# Patient Record
Sex: MALE | Race: WHITE | NOT HISPANIC OR LATINO | URBAN - METROPOLITAN AREA
[De-identification: names, ages, dates, MRNs, and addresses within clinical notes are randomized per-mention and may not be internally consistent; named-entity substitution may affect disease eponyms.]

---

## 2018-02-04 ENCOUNTER — EMERGENCY (EMERGENCY)
Facility: HOSPITAL | Age: 38
LOS: 1 days | Discharge: HOME | End: 2018-02-04
Admitting: EMERGENCY MEDICINE
Payer: COMMERCIAL

## 2018-02-04 VITALS
SYSTOLIC BLOOD PRESSURE: 127 MMHG | HEIGHT: 73 IN | WEIGHT: 179.9 LBS | OXYGEN SATURATION: 99 % | RESPIRATION RATE: 14 BRPM | HEART RATE: 48 BPM | TEMPERATURE: 98 F | DIASTOLIC BLOOD PRESSURE: 75 MMHG

## 2018-02-04 DIAGNOSIS — T18.128A FOOD IN ESOPHAGUS CAUSING OTHER INJURY, INITIAL ENCOUNTER: ICD-10-CM
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
## 2018-02-04 DIAGNOSIS — R09.89 OTHER SPECIFIED SYMPTOMS AND SIGNS INVOLVING THE CIRCULATORY AND RESPIRATORY SYSTEMS: ICD-10-CM

## 2018-02-04 PROCEDURE — 99284 EMERGENCY DEPT VISIT MOD MDM: CPT | Mod: 25

## 2018-02-04 PROCEDURE — 71046 X-RAY EXAM CHEST 2 VIEWS: CPT | Mod: 26

## 2018-02-04 RX ORDER — KETOROLAC TROMETHAMINE 30 MG/ML
30 SYRINGE (ML) INJECTION ONCE
Qty: 0 | Refills: 0 | Status: DISCONTINUED | OUTPATIENT
Start: 2018-02-04 | End: 2018-02-04

## 2018-02-04 NOTE — ED PROVIDER NOTE - OBJECTIVE STATEMENT
36 y/o M    no pmhx    Pt presenst to ER with c/o sensation of foreign body in esophagus at the level of his diaphragm. States he was eating whole salmon last night and felt himself swallow a bone after it was too late to stop himself. States he has been having sharp pain and sensation of foreign body when he swallows fluids or solids at the level diaphragm. Denies choking, difficulty breathing, wheezing, nausea/vomiting, dark stools 36 y/o M    no pmhx    Pt presents to ER with c/o sensation of foreign body in esophagus at the level of his diaphragm. States he was eating whole salmon last night and felt himself swallow a bone after it was too late to stop himself. States he has been having sharp pain and sensation of foreign body when he swallows fluids or solids at the level diaphragm. Denies choking, difficulty breathing, wheezing, nausea/vomiting, dark stools

## 2018-02-04 NOTE — ED PROVIDER NOTE - MEDICAL DECISION MAKING DETAILS
possible esophageal foreign body - small fish bone. Will get Xrays. Pt given toradol and drinking soda in attempt to dislodge

## 2018-02-04 NOTE — ED PROVIDER NOTE - PROGRESS NOTE DETAILS
Pt tolerating solids and liquids well- states toradol greatly releived sxs. Pt is "unsure if i still feel like it is there". Reassured pt that no foreign body or free air was seen on xary. Will give f/u with Dr. Lewis of GI if sxs persist. Given strict return precautions to ER tonight is sxs worsen.

## 2018-02-04 NOTE — ED PROVIDER NOTE - PHYSICAL EXAMINATION
VITAL SIGNS: I have reviewed nursing notes and confirm.  CONSTITUTIONAL: Well-developed; well-nourished; in no acute distress- resting comfortably  SKIN: Skin is warm and dry, no acute rash.  HEAD: Normocephalic; atraumatic.  EYES: PERRL, EOM intact; conjunctiva and sclera clear.  ENT: No nasal discharge; airway clear.  NECK: Supple; non tender.  CARD: S1, S2 normal; no murmurs, gallops, or rubs. Regular rate and rhythm.  RESP: No wheezes, stridor, rales or rhonchi.  ABD: Normal bowel sounds; soft; non-distended; non-tender; no hepatosplenomegaly.  EXT: Normal ROM. No clubbing, cyanosis or edema.  NEURO: Alert, oriented. Grossly unremarkable.  PSYCH: Cooperative, appropriate.

## 2018-02-04 NOTE — ED ADULT TRIAGE NOTE - CHIEF COMPLAINT QUOTE
c/o swallowing a salmon fish bone last night. no excessive drooling, no resp distress, speaking in full sentences, tolerating po. "Its worse when I laugh."

## 2020-04-04 ENCOUNTER — EMERGENCY (EMERGENCY)
Facility: HOSPITAL | Age: 40
LOS: 1 days | Discharge: HOME | End: 2020-04-04
Admitting: EMERGENCY MEDICINE
Payer: COMMERCIAL

## 2020-04-04 VITALS
TEMPERATURE: 98 F | WEIGHT: 179.9 LBS | HEART RATE: 64 BPM | OXYGEN SATURATION: 98 % | HEIGHT: 72 IN | SYSTOLIC BLOOD PRESSURE: 114 MMHG | RESPIRATION RATE: 18 BRPM | DIASTOLIC BLOOD PRESSURE: 72 MMHG

## 2020-04-04 VITALS
TEMPERATURE: 98 F | OXYGEN SATURATION: 96 % | HEART RATE: 61 BPM | RESPIRATION RATE: 16 BRPM | SYSTOLIC BLOOD PRESSURE: 113 MMHG | DIASTOLIC BLOOD PRESSURE: 69 MMHG

## 2020-04-04 LAB
ALBUMIN SERPL ELPH-MCNC: 4 G/DL — SIGNIFICANT CHANGE UP (ref 3.4–5)
ALP SERPL-CCNC: 72 U/L — SIGNIFICANT CHANGE UP (ref 40–120)
ALT FLD-CCNC: 99 U/L — HIGH (ref 12–42)
ANION GAP SERPL CALC-SCNC: 6 MMOL/L — LOW (ref 9–16)
AST SERPL-CCNC: 50 U/L — HIGH (ref 15–37)
BASOPHILS # BLD AUTO: 0.03 K/UL — SIGNIFICANT CHANGE UP (ref 0–0.2)
BASOPHILS NFR BLD AUTO: 0.6 % — SIGNIFICANT CHANGE UP (ref 0–2)
BILIRUB SERPL-MCNC: 0.5 MG/DL — SIGNIFICANT CHANGE UP (ref 0.2–1.2)
BUN SERPL-MCNC: 17 MG/DL — SIGNIFICANT CHANGE UP (ref 7–23)
CALCIUM SERPL-MCNC: 9.2 MG/DL — SIGNIFICANT CHANGE UP (ref 8.5–10.5)
CHLORIDE SERPL-SCNC: 104 MMOL/L — SIGNIFICANT CHANGE UP (ref 96–108)
CO2 SERPL-SCNC: 28 MMOL/L — SIGNIFICANT CHANGE UP (ref 22–31)
CREAT SERPL-MCNC: 1.08 MG/DL — SIGNIFICANT CHANGE UP (ref 0.5–1.3)
EOSINOPHIL # BLD AUTO: 0.14 K/UL — SIGNIFICANT CHANGE UP (ref 0–0.5)
EOSINOPHIL NFR BLD AUTO: 2.8 % — SIGNIFICANT CHANGE UP (ref 0–6)
FLU A RESULT: SIGNIFICANT CHANGE UP
FLU A RESULT: SIGNIFICANT CHANGE UP
FLUAV AG NPH QL: SIGNIFICANT CHANGE UP
FLUBV AG NPH QL: SIGNIFICANT CHANGE UP
GLUCOSE SERPL-MCNC: 96 MG/DL — SIGNIFICANT CHANGE UP (ref 70–99)
HCT VFR BLD CALC: 47.9 % — SIGNIFICANT CHANGE UP (ref 39–50)
HGB BLD-MCNC: 16.3 G/DL — SIGNIFICANT CHANGE UP (ref 13–17)
HIV 1 & 2 AB SERPL IA.RAPID: SIGNIFICANT CHANGE UP
IMM GRANULOCYTES NFR BLD AUTO: 0.2 % — SIGNIFICANT CHANGE UP (ref 0–1.5)
LYMPHOCYTES # BLD AUTO: 2.24 K/UL — SIGNIFICANT CHANGE UP (ref 1–3.3)
LYMPHOCYTES # BLD AUTO: 45.3 % — HIGH (ref 13–44)
MCHC RBC-ENTMCNC: 31.3 PG — SIGNIFICANT CHANGE UP (ref 27–34)
MCHC RBC-ENTMCNC: 34 GM/DL — SIGNIFICANT CHANGE UP (ref 32–36)
MCV RBC AUTO: 91.9 FL — SIGNIFICANT CHANGE UP (ref 80–100)
MONOCYTES # BLD AUTO: 0.33 K/UL — SIGNIFICANT CHANGE UP (ref 0–0.9)
MONOCYTES NFR BLD AUTO: 6.7 % — SIGNIFICANT CHANGE UP (ref 2–14)
NEUTROPHILS # BLD AUTO: 2.19 K/UL — SIGNIFICANT CHANGE UP (ref 1.8–7.4)
NEUTROPHILS NFR BLD AUTO: 44.4 % — SIGNIFICANT CHANGE UP (ref 43–77)
NRBC # BLD: 0 /100 WBCS — SIGNIFICANT CHANGE UP (ref 0–0)
PLATELET # BLD AUTO: 189 K/UL — SIGNIFICANT CHANGE UP (ref 150–400)
POTASSIUM SERPL-MCNC: 3.9 MMOL/L — SIGNIFICANT CHANGE UP (ref 3.5–5.3)
POTASSIUM SERPL-SCNC: 3.9 MMOL/L — SIGNIFICANT CHANGE UP (ref 3.5–5.3)
PROT SERPL-MCNC: 6.9 G/DL — SIGNIFICANT CHANGE UP (ref 6.4–8.2)
RAPID RVP RESULT: SIGNIFICANT CHANGE UP
RBC # BLD: 5.21 M/UL — SIGNIFICANT CHANGE UP (ref 4.2–5.8)
RBC # FLD: 13.1 % — SIGNIFICANT CHANGE UP (ref 10.3–14.5)
RSV RESULT: SIGNIFICANT CHANGE UP
RSV RNA RESP QL NAA+PROBE: SIGNIFICANT CHANGE UP
SODIUM SERPL-SCNC: 138 MMOL/L — SIGNIFICANT CHANGE UP (ref 132–145)
WBC # BLD: 4.94 K/UL — SIGNIFICANT CHANGE UP (ref 3.8–10.5)
WBC # FLD AUTO: 4.94 K/UL — SIGNIFICANT CHANGE UP (ref 3.8–10.5)

## 2020-04-04 PROCEDURE — 99284 EMERGENCY DEPT VISIT MOD MDM: CPT

## 2020-04-04 PROCEDURE — 93010 ELECTROCARDIOGRAM REPORT: CPT | Mod: 59

## 2020-04-04 PROCEDURE — 71045 X-RAY EXAM CHEST 1 VIEW: CPT | Mod: 26

## 2020-04-04 RX ORDER — ALBUTEROL 90 UG/1
2 AEROSOL, METERED ORAL
Qty: 1 | Refills: 0
Start: 2020-04-04

## 2020-04-04 NOTE — ED PROVIDER NOTE - OBJECTIVE STATEMENT
39 yo male with hx sports induced asthma,, felt like heart racing, felt like an asthma attack. hx pollen allergy. low grade fever resolved this past week. +mild cough, woke up with dyspnea now resolved. +diarrhea. muscle aches. throat discomfort. 39 yo male with hx sports induced asthma c/o resolving fever over the past week, felt like heart racing this morning, states it almost felt like an asthma attack. hx pollen allergy. +mild cough, woke up with dyspnea now resolved. +diarrhea. muscle aches. throat discomfort. tolerating po, no vomiting. well appearing, NAD.

## 2020-04-04 NOTE — ED ADULT TRIAGE NOTE - NSTRIAGECARE_GEN_A_ER
Face Mask Consent 2/Introductory Paragraph: Mohs surgery was explained to the patient and consent was obtained. The risks, benefits and alternatives to therapy were discussed in detail. Specifically, the risks of infection, scarring, bleeding, prolonged wound healing, incomplete removal, allergy to anesthesia, nerve injury and recurrence were addressed. Prior to the procedure, the treatment site was clearly identified and confirmed by the patient. All components of Universal Protocol/PAUSE Rule completed.

## 2020-04-04 NOTE — ED PROVIDER NOTE - CLINICAL SUMMARY MEDICAL DECISION MAKING FREE TEXT BOX
viral syndrome, well appearing, tolerating po, covid-19 testing pending, 02 sat 97% on RA, ambulated with patient in ED and did not desat, cxr clear, ekg unremarkable, rx albuterol pump. return precautions discussed.

## 2020-04-04 NOTE — ED ADULT TRIAGE NOTE - CHIEF COMPLAINT QUOTE
c/o SOB, lightheadedness, chest tightness and cough after waking up this morning. pt reports intermittent fevers for the past 10 days. as per pt, has tree pollen allergies and slept with window open. pt speaking in full sentences. denies sick contacts or recent international travel.

## 2020-04-04 NOTE — ED PROVIDER NOTE - NSFOLLOWUPINSTRUCTIONS_ED_ALL_ED_FT
You may have Coronavirus    1. STAY HOME for 14 DAYS  2. Minimize Human contact to ONLY ESSENTIAL  3. Every time you wash your hands, sing the HAPPY BIRTHDAY Song so you know you're washing long enough.  Make sure to scrub the webspace between your fingers.  4. DRINK 2-3 Liters of Pedialyte or Sugarfree Gatorade per day x at least 5 days.  The more you drink, the more energy you will have.  Your fatigue, lightheadedness, and body aches will decrease and your fever has a better chance of breaking if you are well hydrated.    5. For your Fever and Body aches take Tylenol 500mg every 6-8 hours as needed.  6. Use an inhaler for mild shortness of breath and cough  7. RETURN TO THE ER IMMEDIATELY IF YOU HAVE WORSENING SHORTNESS OF BREATH

## 2020-04-04 NOTE — ED PROVIDER NOTE - PATIENT PORTAL LINK FT
You can access the FollowMyHealth Patient Portal offered by [facility] by registering at the following website: [email or web address]. By joining [employer]’s FollowMyHealth portal, you will also be able to view your health information using other applications (apps) compatible with our system.

## 2020-04-05 LAB — SARS-COV-2 RNA SPEC QL NAA+PROBE: SIGNIFICANT CHANGE UP

## 2020-04-08 DIAGNOSIS — R05 COUGH: ICD-10-CM

## 2020-04-08 DIAGNOSIS — B34.9 VIRAL INFECTION, UNSPECIFIED: ICD-10-CM

## 2020-05-12 ENCOUNTER — EMERGENCY (EMERGENCY)
Facility: HOSPITAL | Age: 40
LOS: 1 days | Discharge: HOME | End: 2020-05-12
Attending: EMERGENCY MEDICINE | Admitting: EMERGENCY MEDICINE
Payer: COMMERCIAL

## 2020-05-12 VITALS
DIASTOLIC BLOOD PRESSURE: 89 MMHG | SYSTOLIC BLOOD PRESSURE: 136 MMHG | RESPIRATION RATE: 16 BRPM | HEIGHT: 72 IN | HEART RATE: 56 BPM | OXYGEN SATURATION: 99 % | TEMPERATURE: 98 F | WEIGHT: 179.9 LBS

## 2020-05-12 LAB
ALBUMIN SERPL ELPH-MCNC: 3.6 G/DL — SIGNIFICANT CHANGE UP (ref 3.4–5)
ALP SERPL-CCNC: 69 U/L — SIGNIFICANT CHANGE UP (ref 40–120)
ALT FLD-CCNC: 46 U/L — HIGH (ref 12–42)
ANION GAP SERPL CALC-SCNC: 7 MMOL/L — LOW (ref 9–16)
APTT BLD: 30.3 SEC — SIGNIFICANT CHANGE UP (ref 27.5–36.3)
AST SERPL-CCNC: 27 U/L — SIGNIFICANT CHANGE UP (ref 15–37)
BASOPHILS # BLD AUTO: 0.03 K/UL — SIGNIFICANT CHANGE UP (ref 0–0.2)
BASOPHILS NFR BLD AUTO: 0.5 % — SIGNIFICANT CHANGE UP (ref 0–2)
BILIRUB SERPL-MCNC: 0.3 MG/DL — SIGNIFICANT CHANGE UP (ref 0.2–1.2)
BUN SERPL-MCNC: 20 MG/DL — SIGNIFICANT CHANGE UP (ref 7–23)
CALCIUM SERPL-MCNC: 8.9 MG/DL — SIGNIFICANT CHANGE UP (ref 8.5–10.5)
CHLORIDE SERPL-SCNC: 108 MMOL/L — SIGNIFICANT CHANGE UP (ref 96–108)
CO2 SERPL-SCNC: 26 MMOL/L — SIGNIFICANT CHANGE UP (ref 22–31)
CREAT SERPL-MCNC: 1.6 MG/DL — HIGH (ref 0.5–1.3)
D DIMER BLD IA.RAPID-MCNC: <187 NG/ML DDU — SIGNIFICANT CHANGE UP
EOSINOPHIL # BLD AUTO: 0.13 K/UL — SIGNIFICANT CHANGE UP (ref 0–0.5)
EOSINOPHIL NFR BLD AUTO: 2.3 % — SIGNIFICANT CHANGE UP (ref 0–6)
GLUCOSE SERPL-MCNC: 95 MG/DL — SIGNIFICANT CHANGE UP (ref 70–99)
HCT VFR BLD CALC: 42.9 % — SIGNIFICANT CHANGE UP (ref 39–50)
HGB BLD-MCNC: 14.7 G/DL — SIGNIFICANT CHANGE UP (ref 13–17)
IMM GRANULOCYTES NFR BLD AUTO: 0.2 % — SIGNIFICANT CHANGE UP (ref 0–1.5)
INR BLD: 1.06 — SIGNIFICANT CHANGE UP (ref 0.88–1.16)
LYMPHOCYTES # BLD AUTO: 2.07 K/UL — SIGNIFICANT CHANGE UP (ref 1–3.3)
LYMPHOCYTES # BLD AUTO: 36.3 % — SIGNIFICANT CHANGE UP (ref 13–44)
MCHC RBC-ENTMCNC: 31.6 PG — SIGNIFICANT CHANGE UP (ref 27–34)
MCHC RBC-ENTMCNC: 34.3 GM/DL — SIGNIFICANT CHANGE UP (ref 32–36)
MCV RBC AUTO: 92.3 FL — SIGNIFICANT CHANGE UP (ref 80–100)
MONOCYTES # BLD AUTO: 0.49 K/UL — SIGNIFICANT CHANGE UP (ref 0–0.9)
MONOCYTES NFR BLD AUTO: 8.6 % — SIGNIFICANT CHANGE UP (ref 2–14)
NEUTROPHILS # BLD AUTO: 2.98 K/UL — SIGNIFICANT CHANGE UP (ref 1.8–7.4)
NEUTROPHILS NFR BLD AUTO: 52.1 % — SIGNIFICANT CHANGE UP (ref 43–77)
NRBC # BLD: 0 /100 WBCS — SIGNIFICANT CHANGE UP (ref 0–0)
PLATELET # BLD AUTO: 180 K/UL — SIGNIFICANT CHANGE UP (ref 150–400)
POTASSIUM SERPL-MCNC: 4.3 MMOL/L — SIGNIFICANT CHANGE UP (ref 3.5–5.3)
POTASSIUM SERPL-SCNC: 4.3 MMOL/L — SIGNIFICANT CHANGE UP (ref 3.5–5.3)
PROT SERPL-MCNC: 6.4 G/DL — SIGNIFICANT CHANGE UP (ref 6.4–8.2)
PROTHROM AB SERPL-ACNC: 11.8 SEC — SIGNIFICANT CHANGE UP (ref 10–12.9)
RBC # BLD: 4.65 M/UL — SIGNIFICANT CHANGE UP (ref 4.2–5.8)
RBC # FLD: 13.6 % — SIGNIFICANT CHANGE UP (ref 10.3–14.5)
SODIUM SERPL-SCNC: 141 MMOL/L — SIGNIFICANT CHANGE UP (ref 132–145)
TROPONIN I SERPL-MCNC: <0.017 NG/ML — LOW (ref 0.02–0.06)
WBC # BLD: 5.71 K/UL — SIGNIFICANT CHANGE UP (ref 3.8–10.5)
WBC # FLD AUTO: 5.71 K/UL — SIGNIFICANT CHANGE UP (ref 3.8–10.5)

## 2020-05-12 PROCEDURE — 99284 EMERGENCY DEPT VISIT MOD MDM: CPT

## 2020-05-12 PROCEDURE — 93010 ELECTROCARDIOGRAM REPORT: CPT | Mod: 59

## 2020-05-12 RX ORDER — OFLOXACIN 0.3 %
1 DROPS OPHTHALMIC (EYE) ONCE
Refills: 0 | Status: COMPLETED | OUTPATIENT
Start: 2020-05-12 | End: 2020-05-12

## 2020-05-12 RX ADMIN — Medication 1 DROP(S): at 23:28

## 2020-05-12 NOTE — ED PROVIDER NOTE - CLINICAL SUMMARY MEDICAL DECISION MAKING FREE TEXT BOX
screening ekg nonischemic, d-dimer for low risk PE eval, ofloxaxin for corneal abrasion, reassess screening ekg nonischemic, d-dimer for low risk PE eval, ofloxaxin for corneal abrasion, duration and presentation not c/w acs, cardiac markers added to eval for mateus-myocarditis though ekg has no overt findings to suggest such,

## 2020-05-12 NOTE — ED PROVIDER NOTE - OBJECTIVE STATEMENT
40 yom pw med eval.  pt states sx initially began 3/23 w/ fever, 99-100F, about 1 wk, then fever went to 102 for 3 days, a/w upper abdomen "electrical sensation" and similar sensation in throat, but only at night.  1 day of sob which resolved on its own.  subsequently, 9 days later, pt re-developed fever to 102 x 2 days.  also states loose stool since 3/23, no watery diarrhea.  no vomiting.  nonbloody diarrhea.  no preceding abx or hospitalization.  at the same time, pink eye, went to , had tobradex w/o relief, transitioned to ofloxacin for 7 days, after which noted fb sensation when using contacts.  also reports pressure like HA x 2 wk w/ difficulty to concentrate, no blurry vision, no neck pain, no focal weakness, no change in speech.  able to complete ADL.  most recently, pt reports 4 days of chest pain, constant, relives by aspirin, tightness, nonradiating, no pleurisy or orthopnea.  pt states sx resolves w/ aspirin and came in as he doesn't want to keep taking aspirin, and was concerned for vasculitis associated w/ covid.  denies hx of dvt/pe, no calf pain/swelling, no exogenous hormone.   denies any PMH.  FHx of mom of unclear cardiac event at age of 40 but quadruple bypass at age of 60.  no cigarette/cocaine/heroin.  sexually active, last active in March 8, 2020, prior to that, Nov 2019, both insertive and receptive, either uses truvada or condom.  recent neg HIV test.  pt had neg covid swab and neg covid antibody test recently.

## 2020-05-12 NOTE — ED ADULT NURSE NOTE - CHPI ED NUR SYMPTOMS NEG
no back pain/no syncope/no fever/no nausea/no chills/no diaphoresis/no dizziness/no congestion/no vomiting/no shortness of breath

## 2020-05-12 NOTE — ED PROVIDER NOTE - PATIENT PORTAL LINK FT
You can access the FollowMyHealth Patient Portal offered by [facility] by registering at the following website: [email or web address]. By joining [employer]’s FollowMyHealth portal, you will also be able to view your health information using other applications (apps) compatible with our system.

## 2020-05-12 NOTE — ED PROVIDER NOTE - CARE PLAN
Principal Discharge DX:	Chest tightness Principal Discharge DX:	Chest tightness  Secondary Diagnosis:	MAURICIO (acute kidney injury)

## 2020-05-12 NOTE — ED PROVIDER NOTE - NSFOLLOWUPINSTRUCTIONS_ED_ALL_ED_FT
Follow up with your primary care doctor/listed below  Stop using aspirin/motrin/ibuprofen/naproxen or any NSAIDs  Use tylenol instead for pain as needed  Return immediately for any new or worsening symptoms or any new concerns Follow up with your primary care doctor/listed below  Stop using aspirin/motrin/ibuprofen/naproxen or any NSAIDs  Use tylenol instead for pain as needed  Put 2 drops into affected eye every 15 minutes for the first 6 hours, then 2 drops into the affected eye every 30 minutes for the remainder of the first day. On day 2, instill 2 drops into the affected eye hourly. On days 3-14, instill 2 drops into affected eye every 4 hours   Discuss with your PCP about follow up kidney function testing  Return immediately for any new or worsening symptoms or any new concerns

## 2020-05-12 NOTE — ED PROVIDER NOTE - PROGRESS NOTE DETAILS
pt reports 325mg asa x 2 BID x 4 days, last dose was 5pm.  likely ASA induced MAURICIO.  no agap, and normal bicarb, low suspicion for acidosis, dose < 150mg/kg asa over the last 4 days in total, instructed pt to stop taking asa.

## 2020-05-12 NOTE — ED PROVIDER NOTE - PHYSICAL EXAMINATION
Physical Exam  GEN: Awake, alert, non-toxic appearing, NCAT  EYES: full EOMI, no chemosis, fluorescein stain w/ small scratches R>L,   ENT: External inspection normal, normal voice,   HEAD: atraumatic  NECK: FROM neck, supple, no meningismus, trachea midline, no JVD  CV: rrr, no edema  RESP: cta bl, no tachypnea, no hypoxia, no resp distress,  GI: +BS, Soft, nontender, no guarding/rebound,   MSK: FROM all 4 extremities,

## 2020-05-12 NOTE — ED ADULT NURSE NOTE - OBJECTIVE STATEMENT
41 yo M c/o multiple medical complaints. Reports that since March, pt has experienced intermittent: fever, diarrhea, "pink eye", chest pain, and headaches. Pt states "when I try to put in my contacts it feels like there is glass in my eyes." Highest fever 102, pt reports being afebrile for last two weeks. CP is intermittent and worsens upon breathing. Tested COVID neg. Denies medical hx, trauma, syncope. Pt speaking in full complete sentences, ambulatory with steady gait.

## 2020-05-12 NOTE — ED ADULT TRIAGE NOTE - CHIEF COMPLAINT QUOTE
chest pain pt rambling difficult to redirect back to presentation to the er. states tested negative for covid 19 and antibodies but has had recurrent chest pain for 2 weeks unable to states exactly where in chest, denies radiation, also has had recurrent "pink eye" and "no one seems to care", denies current chest pain

## 2020-05-13 LAB
APAP SERPL-MCNC: <2 UG/ML — LOW (ref 10–30)
PCO2 BLDV: 46 MMHG — SIGNIFICANT CHANGE UP (ref 41–51)
PH BLDV: 7.38 — SIGNIFICANT CHANGE UP (ref 7.32–7.43)
PO2 BLDV: 37 MMHG — SIGNIFICANT CHANGE UP (ref 35–40)
SALICYLATES SERPL-MCNC: 10.6 MG/DL — SIGNIFICANT CHANGE UP (ref 2.8–20)
SAO2 % BLDV: 71 % — SIGNIFICANT CHANGE UP
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
## 2020-05-16 DIAGNOSIS — S05.02XA INJURY OF CONJUNCTIVA AND CORNEAL ABRASION WITHOUT FOREIGN BODY, LEFT EYE, INITIAL ENCOUNTER: ICD-10-CM

## 2020-05-16 DIAGNOSIS — N17.9 ACUTE KIDNEY FAILURE, UNSPECIFIED: ICD-10-CM
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
## 2020-05-16 DIAGNOSIS — R07.89 OTHER CHEST PAIN: ICD-10-CM
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
## 2020-05-16 DIAGNOSIS — S05.01XA INJURY OF CONJUNCTIVA AND CORNEAL ABRASION WITHOUT FOREIGN BODY, RIGHT EYE, INITIAL ENCOUNTER: ICD-10-CM

## 2020-05-16 DIAGNOSIS — R50.9 FEVER, UNSPECIFIED: ICD-10-CM
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
## 2020-10-28 ENCOUNTER — EMERGENCY (EMERGENCY)
Facility: HOSPITAL | Age: 40
LOS: 1 days | Discharge: HOME | End: 2020-10-28
Attending: EMERGENCY MEDICINE | Admitting: EMERGENCY MEDICINE
Payer: COMMERCIAL

## 2020-10-28 VITALS
RESPIRATION RATE: 16 BRPM | TEMPERATURE: 98 F | DIASTOLIC BLOOD PRESSURE: 77 MMHG | SYSTOLIC BLOOD PRESSURE: 120 MMHG | OXYGEN SATURATION: 96 % | WEIGHT: 184.97 LBS | HEART RATE: 54 BPM | HEIGHT: 72 IN

## 2020-10-28 DIAGNOSIS — R09.81 NASAL CONGESTION: ICD-10-CM

## 2020-10-28 DIAGNOSIS — U07.1 COVID-19: ICD-10-CM

## 2020-10-28 PROBLEM — Z78.9 OTHER SPECIFIED HEALTH STATUS: Chronic | Status: ACTIVE | Noted: 2020-05-12

## 2020-10-28 PROCEDURE — 99282 EMERGENCY DEPT VISIT SF MDM: CPT

## 2020-10-28 NOTE — ED PROVIDER NOTE - OBJECTIVE STATEMENT
41 y/o M with no pertinent PMHx presents to the ED requesting to have screening testing done for COVID-19. Pt reports he was tested positive for COVID-19 30 minutes PTA. He states he wanted to have a second test at the ED. Pt endorses nasal congestion. He denies fevers, chills, coughs, CP, SOB, recent travels and any known contacts with any individuals tested positive for COVID-19.

## 2020-10-28 NOTE — ED PROVIDER NOTE - CLINICAL SUMMARY MEDICAL DECISION MAKING FREE TEXT BOX
41 y/o M presents to the ED requesting to have screening testing done for COVID-19. On exam, Pt appears well and in no apparent distress. No vital sign derangements. No conversational dyspnea. Will swab for COVID-19 and D/C afterwards. 41 y/o M presents to the ED requesting to have screening testing done for COVID-19. On exam, Pt appears well and in no apparent distress. No vital sign derangements. No conversational dyspnea. Discussed the risk benefits of second covid test with patient in terms of test sensitivity and specificity. Post education pt declines second test and was receptive to quarantine education and requirements.+-1

## 2020-10-28 NOTE — ED PROVIDER NOTE - PATIENT PORTAL LINK FT
You can access the FollowMyHealth Patient Portal offered by [facility] by registering at the following website: [email or web address]. By joining [employer]’s FollowMyHealth portal, you will also be able to view your health information using other applications (apps) compatible with our system.

## 2020-11-19 ENCOUNTER — EMERGENCY (EMERGENCY)
Facility: HOSPITAL | Age: 40
LOS: 1 days | Discharge: HOME | End: 2020-11-19
Attending: EMERGENCY MEDICINE | Admitting: EMERGENCY MEDICINE
Payer: COMMERCIAL

## 2020-11-19 VITALS
DIASTOLIC BLOOD PRESSURE: 70 MMHG | RESPIRATION RATE: 18 BRPM | SYSTOLIC BLOOD PRESSURE: 115 MMHG | HEART RATE: 64 BPM | TEMPERATURE: 98 F | OXYGEN SATURATION: 98 % | HEIGHT: 72 IN

## 2020-11-19 DIAGNOSIS — J02.9 ACUTE PHARYNGITIS, UNSPECIFIED: ICD-10-CM

## 2020-11-19 DIAGNOSIS — U07.1 COVID-19: ICD-10-CM

## 2020-11-19 LAB — S PYO AG SPEC QL IA: NEGATIVE — SIGNIFICANT CHANGE UP

## 2020-11-19 PROCEDURE — 99284 EMERGENCY DEPT VISIT MOD MDM: CPT

## 2020-11-19 RX ORDER — AZITHROMYCIN 500 MG/1
1000 TABLET, FILM COATED ORAL ONCE
Refills: 0 | Status: COMPLETED | OUTPATIENT
Start: 2020-11-19 | End: 2020-11-19

## 2020-11-19 RX ORDER — CEFTRIAXONE 500 MG/1
250 INJECTION, POWDER, FOR SOLUTION INTRAMUSCULAR; INTRAVENOUS ONCE
Refills: 0 | Status: COMPLETED | OUTPATIENT
Start: 2020-11-19 | End: 2020-11-19

## 2020-11-19 RX ADMIN — AZITHROMYCIN 1000 MILLIGRAM(S): 500 TABLET, FILM COATED ORAL at 22:26

## 2020-11-19 RX ADMIN — CEFTRIAXONE 250 MILLIGRAM(S): 500 INJECTION, POWDER, FOR SOLUTION INTRAMUSCULAR; INTRAVENOUS at 22:26

## 2020-11-19 NOTE — ED PROVIDER NOTE - PATIENT PORTAL LINK FT
Yes You can access the FollowMyHealth Patient Portal offered by [facility] by registering at the following website: [email or web address]. By joining [employer]’s FollowMyHealth portal, you will also be able to view your health information using other applications (apps) compatible with our system.

## 2020-11-19 NOTE — ED PROVIDER NOTE - OBJECTIVE STATEMENT
39 yo female pt, no hx of med problems, nkda. Presents for repeat covid testing. Pt tested + 4 weeks ago. Currently considering visiting family for the holidays sp wanted to make sure he had a neg covid swab. Has had a persistent "scratchy" throat so was wondering if he could also have a throat swab. no fever, no chills, no chest pain, no sob, no abd pain, no nausea, no vomiting.

## 2020-11-19 NOTE — ED PROVIDER NOTE - CLINICAL SUMMARY MEDICAL DECISION MAKING FREE TEXT BOX
Swabbed for covid. also throat rapid strep and culture for persistent throat discomfort. normal throat on exam, no erythema, no exudates.

## 2020-11-19 NOTE — ED PROVIDER NOTE - PROGRESS NOTE DETAILS
don strep, pt had concern for std, covered w azithro/ceftriaxone empirically, encouraged f/u w primary care for testing.

## 2020-11-20 LAB — SARS-COV-2 RNA SPEC QL NAA+PROBE: DETECTED

## 2020-11-22 LAB
CULTURE RESULTS: SIGNIFICANT CHANGE UP
SPECIMEN SOURCE: SIGNIFICANT CHANGE UP

## 2020-11-24 ENCOUNTER — EMERGENCY (EMERGENCY)
Facility: HOSPITAL | Age: 40
LOS: 1 days | Discharge: HOME | End: 2020-11-24
Admitting: EMERGENCY MEDICINE
Payer: COMMERCIAL

## 2020-11-24 VITALS
OXYGEN SATURATION: 98 % | DIASTOLIC BLOOD PRESSURE: 69 MMHG | SYSTOLIC BLOOD PRESSURE: 112 MMHG | HEART RATE: 56 BPM | RESPIRATION RATE: 16 BRPM | TEMPERATURE: 98 F

## 2020-11-24 VITALS
WEIGHT: 179.9 LBS | SYSTOLIC BLOOD PRESSURE: 100 MMHG | OXYGEN SATURATION: 98 % | TEMPERATURE: 98 F | RESPIRATION RATE: 16 BRPM | HEIGHT: 72 IN | HEART RATE: 103 BPM | DIASTOLIC BLOOD PRESSURE: 63 MMHG

## 2020-11-24 DIAGNOSIS — Z79.899 OTHER LONG TERM (CURRENT) DRUG THERAPY: ICD-10-CM

## 2020-11-24 DIAGNOSIS — R07.89 OTHER CHEST PAIN: ICD-10-CM

## 2020-11-24 DIAGNOSIS — U07.1 COVID-19: ICD-10-CM

## 2020-11-24 DIAGNOSIS — R00.0 TACHYCARDIA, UNSPECIFIED: ICD-10-CM

## 2020-11-24 LAB
ALBUMIN SERPL ELPH-MCNC: 4.1 G/DL — SIGNIFICANT CHANGE UP (ref 3.4–5)
ALP SERPL-CCNC: 63 U/L — SIGNIFICANT CHANGE UP (ref 40–120)
ALT FLD-CCNC: 45 U/L — HIGH (ref 12–42)
AMPHET UR-MCNC: NEGATIVE — SIGNIFICANT CHANGE UP
ANION GAP SERPL CALC-SCNC: 6 MMOL/L — LOW (ref 9–16)
APTT BLD: 33.8 SEC — SIGNIFICANT CHANGE UP (ref 27.5–35.5)
AST SERPL-CCNC: 41 U/L — HIGH (ref 15–37)
BARBITURATES UR SCN-MCNC: NEGATIVE — SIGNIFICANT CHANGE UP
BASOPHILS # BLD AUTO: 0.03 K/UL — SIGNIFICANT CHANGE UP (ref 0–0.2)
BASOPHILS NFR BLD AUTO: 0.7 % — SIGNIFICANT CHANGE UP (ref 0–2)
BENZODIAZ UR-MCNC: NEGATIVE — SIGNIFICANT CHANGE UP
BILIRUB SERPL-MCNC: 0.4 MG/DL — SIGNIFICANT CHANGE UP (ref 0.2–1.2)
BUN SERPL-MCNC: 20 MG/DL — SIGNIFICANT CHANGE UP (ref 7–23)
CALCIUM SERPL-MCNC: 9.2 MG/DL — SIGNIFICANT CHANGE UP (ref 8.5–10.5)
CHLORIDE SERPL-SCNC: 107 MMOL/L — SIGNIFICANT CHANGE UP (ref 96–108)
CO2 SERPL-SCNC: 28 MMOL/L — SIGNIFICANT CHANGE UP (ref 22–31)
COCAINE METAB.OTHER UR-MCNC: NEGATIVE — SIGNIFICANT CHANGE UP
CREAT SERPL-MCNC: 1.06 MG/DL — SIGNIFICANT CHANGE UP (ref 0.5–1.3)
EOSINOPHIL # BLD AUTO: 0.07 K/UL — SIGNIFICANT CHANGE UP (ref 0–0.5)
EOSINOPHIL NFR BLD AUTO: 1.6 % — SIGNIFICANT CHANGE UP (ref 0–6)
ETHANOL SERPL-MCNC: <3 MG/DL — SIGNIFICANT CHANGE UP
GLUCOSE SERPL-MCNC: 93 MG/DL — SIGNIFICANT CHANGE UP (ref 70–99)
HCT VFR BLD CALC: 44.3 % — SIGNIFICANT CHANGE UP (ref 39–50)
HGB BLD-MCNC: 15.1 G/DL — SIGNIFICANT CHANGE UP (ref 13–17)
IMM GRANULOCYTES NFR BLD AUTO: 0.2 % — SIGNIFICANT CHANGE UP (ref 0–1.5)
INR BLD: 1.05 — SIGNIFICANT CHANGE UP (ref 0.88–1.16)
LACTATE SERPL-SCNC: 0.8 MMOL/L — SIGNIFICANT CHANGE UP (ref 0.4–2)
LIDOCAIN IGE QN: 201 U/L — SIGNIFICANT CHANGE UP (ref 73–393)
LYMPHOCYTES # BLD AUTO: 1.93 K/UL — SIGNIFICANT CHANGE UP (ref 1–3.3)
LYMPHOCYTES # BLD AUTO: 44.1 % — HIGH (ref 13–44)
MAGNESIUM SERPL-MCNC: 2.2 MG/DL — SIGNIFICANT CHANGE UP (ref 1.6–2.6)
MCHC RBC-ENTMCNC: 31.7 PG — SIGNIFICANT CHANGE UP (ref 27–34)
MCHC RBC-ENTMCNC: 34.1 GM/DL — SIGNIFICANT CHANGE UP (ref 32–36)
MCV RBC AUTO: 93.1 FL — SIGNIFICANT CHANGE UP (ref 80–100)
METHADONE UR-MCNC: NEGATIVE — SIGNIFICANT CHANGE UP
MONOCYTES # BLD AUTO: 0.34 K/UL — SIGNIFICANT CHANGE UP (ref 0–0.9)
MONOCYTES NFR BLD AUTO: 7.8 % — SIGNIFICANT CHANGE UP (ref 2–14)
NEUTROPHILS # BLD AUTO: 2 K/UL — SIGNIFICANT CHANGE UP (ref 1.8–7.4)
NEUTROPHILS NFR BLD AUTO: 45.6 % — SIGNIFICANT CHANGE UP (ref 43–77)
NRBC # BLD: 0 /100 WBCS — SIGNIFICANT CHANGE UP (ref 0–0)
NT-PROBNP SERPL-SCNC: 44 PG/ML — SIGNIFICANT CHANGE UP
OPIATES UR-MCNC: NEGATIVE — SIGNIFICANT CHANGE UP
PCO2 BLDV: 52 MMHG — HIGH (ref 41–51)
PCP SPEC-MCNC: SIGNIFICANT CHANGE UP
PCP UR-MCNC: NEGATIVE — SIGNIFICANT CHANGE UP
PH BLDV: 7.37 — SIGNIFICANT CHANGE UP (ref 7.32–7.43)
PLATELET # BLD AUTO: 179 K/UL — SIGNIFICANT CHANGE UP (ref 150–400)
PO2 BLDV: 25 MMHG — LOW (ref 35–40)
POTASSIUM SERPL-MCNC: 4.8 MMOL/L — SIGNIFICANT CHANGE UP (ref 3.5–5.3)
POTASSIUM SERPL-SCNC: 4.8 MMOL/L — SIGNIFICANT CHANGE UP (ref 3.5–5.3)
PROT SERPL-MCNC: 7 G/DL — SIGNIFICANT CHANGE UP (ref 6.4–8.2)
PROTHROM AB SERPL-ACNC: 12.6 SEC — SIGNIFICANT CHANGE UP (ref 10.6–13.6)
RBC # BLD: 4.76 M/UL — SIGNIFICANT CHANGE UP (ref 4.2–5.8)
RBC # FLD: 13 % — SIGNIFICANT CHANGE UP (ref 10.3–14.5)
SAO2 % BLDV: 44 % — SIGNIFICANT CHANGE UP
SARS-COV-2 RNA SPEC QL NAA+PROBE: DETECTED
SODIUM SERPL-SCNC: 141 MMOL/L — SIGNIFICANT CHANGE UP (ref 132–145)
THC UR QL: NEGATIVE — SIGNIFICANT CHANGE UP
TROPONIN I SERPL-MCNC: <0.017 NG/ML — LOW (ref 0.02–0.06)
WBC # BLD: 4.38 K/UL — SIGNIFICANT CHANGE UP (ref 3.8–10.5)
WBC # FLD AUTO: 4.38 K/UL — SIGNIFICANT CHANGE UP (ref 3.8–10.5)

## 2020-11-24 PROCEDURE — 71045 X-RAY EXAM CHEST 1 VIEW: CPT | Mod: 26

## 2020-11-24 PROCEDURE — 71275 CT ANGIOGRAPHY CHEST: CPT | Mod: 26

## 2020-11-24 PROCEDURE — 93010 ELECTROCARDIOGRAM REPORT: CPT | Mod: 59

## 2020-11-24 PROCEDURE — 99285 EMERGENCY DEPT VISIT HI MDM: CPT | Mod: 25

## 2020-11-24 NOTE — ED PROVIDER NOTE - PATIENT PORTAL LINK FT
You can access the FollowMyHealth Patient Portal offered by [facility] by registering at the following website: [email or web address]. By joining [facility]’s FollowMyHealth portal, you will also be able to view your health information using other applications (apps) compatible with our system.

## 2020-11-24 NOTE — ED PROVIDER NOTE - CHPI ED SYMPTOMS NEG
no shortness of breath/no fever/no wheezing/no chills/no dizziness, no N/V/D/V, no abdominal pain, no malaise/no cough/no edema

## 2020-11-24 NOTE — ED ADULT NURSE NOTE - OBJECTIVE STATEMENT
Pt. presents with mid seternal cj Pt. presents with mid sternal chest pressure x 2 days. Pt. states he tested +COVID 4 weeks ago put for 2 weeks has been feeling symptom free. Pt. reports he repeated PCR test on 11/19/20 and results were positive. Pt. denies chest pain/palpitations, SOB, HA, dizziness, v/d or abdominal pain. States he woke up with mild nausea but now has resolved. Pt. has non labored breathing, speaking in full sentences with no use of accessory muscle, nasal flaring or cough.

## 2020-11-24 NOTE — ED PROVIDER NOTE - OBJECTIVE STATEMENT
39 y/o male with  recent diagnosis of covid x4 weeks ago presents to ED co CP x2 days. On arrival, pt is tachycardic. Pt states he has no other medical complaints. Denies fever, chills, wheezing, SOB, palpitations, peripheral edema, focal weakness, sore throat, tinnitus, headache, dizziness, N/V/D/C, abdominal pain, change in urinary/bowel function, and malaise.

## 2020-11-24 NOTE — ED ADULT NURSE NOTE - CHPI ED NUR SYMPTOMS NEG
no diaphoresis/no fever/no dizziness/no syncope/no shortness of breath/no vomiting/no back pain/no chills

## 2020-11-24 NOTE — ED PROVIDER NOTE - CLINICAL SUMMARY MEDICAL DECISION MAKING FREE TEXT BOX
39 y/o male presents to ED c/o of chest pain. Of note, pt received covid diagnosis x4 weeks ago. On arrival, pt was tachycardic. Otherwise, vitals are within normal range. Will order labs, EKG, and chest X-ray to r/o PE. Follow-up with pulmonologist.

## 2020-11-24 NOTE — ED ADULT TRIAGE NOTE - CHIEF COMPLAINT QUOTE
Walk in with c/o left sided CP x2days "pressure"  with intermittent radiation to left arm, and HILL, + covid x4 weeks PTA.

## 2020-12-05 ENCOUNTER — EMERGENCY (EMERGENCY)
Facility: HOSPITAL | Age: 40
LOS: 1 days | Discharge: HOME | End: 2020-12-05
Attending: EMERGENCY MEDICINE | Admitting: EMERGENCY MEDICINE
Payer: COMMERCIAL

## 2020-12-05 VITALS
SYSTOLIC BLOOD PRESSURE: 108 MMHG | HEART RATE: 55 BPM | TEMPERATURE: 98 F | RESPIRATION RATE: 16 BRPM | DIASTOLIC BLOOD PRESSURE: 68 MMHG | OXYGEN SATURATION: 97 % | HEIGHT: 72 IN

## 2020-12-05 DIAGNOSIS — U07.1 COVID-19: ICD-10-CM

## 2020-12-05 LAB
SARS-COV-2 IGG SERPL QL IA: POSITIVE
SARS-COV-2 IGM SERPL IA-ACNC: 7.61 INDEX — HIGH

## 2020-12-05 PROCEDURE — 99283 EMERGENCY DEPT VISIT LOW MDM: CPT

## 2020-12-05 NOTE — ED PROVIDER NOTE - OBJECTIVE STATEMENT
40 year old male presenting to the ED requesting COVID-19 screening. Patient is currently asymptomatic and has no other medical complaints at this time. Denies fever, chills, chest pain, SOB.

## 2020-12-05 NOTE — ED PROVIDER NOTE - PATIENT PORTAL LINK FT
You can access the FollowMyHealth Patient Portal offered by [facility] by registering at the following website: [email or web address]. By joining [employer]’s FollowMyHealth portal, you will also be able to view your health information using other applications (apps) compatible with our system.

## 2020-12-06 LAB — SARS-COV-2 RNA SPEC QL NAA+PROBE: SIGNIFICANT CHANGE UP

## 2020-12-10 ENCOUNTER — EMERGENCY (EMERGENCY)
Facility: HOSPITAL | Age: 40
LOS: 1 days | Discharge: HOME | End: 2020-12-10
Attending: EMERGENCY MEDICINE | Admitting: EMERGENCY MEDICINE
Payer: COMMERCIAL

## 2020-12-10 VITALS
HEART RATE: 62 BPM | SYSTOLIC BLOOD PRESSURE: 99 MMHG | TEMPERATURE: 98 F | DIASTOLIC BLOOD PRESSURE: 59 MMHG | HEIGHT: 72 IN | RESPIRATION RATE: 16 BRPM | OXYGEN SATURATION: 97 %

## 2020-12-10 DIAGNOSIS — Z20.828 CONTACT WITH AND (SUSPECTED) EXPOSURE TO OTHER VIRAL COMMUNICABLE DISEASES: ICD-10-CM

## 2020-12-10 PROCEDURE — 99283 EMERGENCY DEPT VISIT LOW MDM: CPT

## 2020-12-10 NOTE — ED ADULT TRIAGE NOTE - NS ED NURSE DISCH DISPOSITION
Discharged V-Y Plasty Text: The defect edges were debeveled with a #15 scalpel blade.  Given the location of the defect, shape of the defect and the proximity to free margins an V-Y advancement flap was deemed most appropriate.  Using a sterile surgical marker, an appropriate advancement flap was drawn incorporating the defect and placing the expected incisions within the relaxed skin tension lines where possible.    The area thus outlined was incised deep to adipose tissue with a #15 scalpel blade.  The skin margins were undermined to an appropriate distance in all directions utilizing iris scissors.

## 2020-12-10 NOTE — ED ADULT TRIAGE NOTE - CHIEF COMPLAINT QUOTE
"vitamin D2 (ERGOCALCIFEROL) 36769 units (1250 mcg) capsule  Last Written Prescription Date:  01/02/2017  Last Fill Quantity: 8,  # refills: 6   Last office visit: 2/18/2019 with prescribing provider:  02/18/2019   Future Office Visit:    Requested Prescriptions   Pending Prescriptions Disp Refills     vitamin D2 (ERGOCALCIFEROL) 90629 units (1250 mcg) capsule 8 capsule 6     Sig: Take 1 capsule (50,000 Units) by mouth every 7 days       There is no refill protocol information for this order        lisinopril (PRINIVIL/ZESTRIL) 2.5 MG tablet 30 tablet 0     Sig: Take 1 tablet (2.5 mg) by mouth daily       ACE Inhibitors (Including Combos) Protocol Failed - 6/12/2019  2:32 PM        Failed - Blood pressure under 140/90 in past 12 months     BP Readings from Last 3 Encounters:   04/01/19 155/88   02/18/19 128/74   12/18/18 142/84                 Passed - Recent (12 mo) or future (30 days) visit within the authorizing provider's specialty     Patient had office visit in the last 12 months or has a visit in the next 30 days with authorizing provider or within the authorizing provider's specialty.  See \"Patient Info\" tab in inbasket, or \"Choose Columns\" in Meds & Orders section of the refill encounter.              Passed - Medication is active on med list        Passed - Patient is age 18 or older        Passed - No active pregnancy on record        Passed - Normal serum creatinine on file in past 12 months     Recent Labs   Lab Test 07/09/18  0819   CR 0.66             Passed - Normal serum potassium on file in past 12 months     Recent Labs   Lab Test 07/09/18  0819   POTASSIUM 3.6             Passed - No positive pregnancy test within past 12 months        alum & mag hydroxide-simethicone (ANTACID) 200-200-20 MG/5ML SUSP suspension 150 mL 0     Sig: Take 15 mLs by mouth every 4 hours as needed for indigestion       There is no refill protocol information for this order        pioglitazone (ACTOS) 45 MG tablet 30 " Covid test. Was Covid + in April. Has lingering symptoms tablet 0     Sig: Take 1 tablet (45 mg) by mouth daily       Thiazolidinedione Agents (TZDs)  Failed - 6/12/2019  2:32 PM        Failed - Blood pressure less than 140/90 in past 6 months     BP Readings from Last 3 Encounters:   04/01/19 155/88   02/18/19 128/74   12/18/18 142/84                 Failed - Patient has documented LDL within the past 12 mos.     Recent Labs   Lab Test 03/16/18  1211   *             Failed - Patient has a normal ALT within the past 12 mos.     Recent Labs   Lab Test 03/16/18  1211   ALT 16             Failed - Patient has a normal AST within the past 12 mos.      Recent Labs   Lab Test 01/09/15  1656   AST 17             Failed - Patient has had a Microalbumin in the past 12 mos.     Recent Labs   Lab Test 03/16/18  1212  07/16/12  1539   [id number]  --   --  5.0   [id number]  --   --  13.3   MICROL 12   < >  --    UMALCR 24.21   < >  --     < > = values in this interval not displayed.             Passed - Patient has documented A1c within the specified period of time.     If HgbA1C is 8 or greater, it needs to be on file within the past 3 months.  If less than 8, must be on file within the past 6 months.     Recent Labs   Lab Test 04/01/19 09/17/18  1014   A1C  --   --  8.9*   HEMOGLOBINA1 9.1*   < >  --     < > = values in this interval not displayed.             Passed - Diagnosis not CHF        Passed - Medication is active on med list        Passed - Patient is age 18 or older        Passed - Patient is not pregnant        Passed - Patient has a normal serum Creatinine in the past 12 months     Recent Labs   Lab Test 07/09/18  0819   CR 0.66             Passed - Patient has not had a positive pregnancy test within the past 12 mos.        Passed - Recent (6 mo) or future (30 days) visit within the authorizing provider's specialty     Patient had office visit in the last 6 months or has a visit in the next 30 days with authorizing provider or within the authorizing provider's  "specialty.  See \"Patient Info\" tab in inbasket, or \"Choose Columns\" in Meds & Orders section of the refill encounter.          lisinopril (PRINIVIL/ZESTRIL) 2.5 MG tablet  Last Written Prescription Date:  05/14/2019  Last Fill Quantity: 30,  # refills: 0   Last office visit: 2/18/2019 with prescribing provider:  02/18/2019   Future Office Visit:    Requested Prescriptions   Pending Prescriptions Disp Refills     vitamin D2 (ERGOCALCIFEROL) 43532 units (1250 mcg) capsule 8 capsule 6     Sig: Take 1 capsule (50,000 Units) by mouth every 7 days       There is no refill protocol information for this order        lisinopril (PRINIVIL/ZESTRIL) 2.5 MG tablet 30 tablet 0     Sig: Take 1 tablet (2.5 mg) by mouth daily       ACE Inhibitors (Including Combos) Protocol Failed - 6/12/2019  2:32 PM        Failed - Blood pressure under 140/90 in past 12 months     BP Readings from Last 3 Encounters:   04/01/19 155/88   02/18/19 128/74   12/18/18 142/84                 Passed - Recent (12 mo) or future (30 days) visit within the authorizing provider's specialty     Patient had office visit in the last 12 months or has a visit in the next 30 days with authorizing provider or within the authorizing provider's specialty.  See \"Patient Info\" tab in inbasket, or \"Choose Columns\" in Meds & Orders section of the refill encounter.              Passed - Medication is active on med list        Passed - Patient is age 18 or older        Passed - No active pregnancy on record        Passed - Normal serum creatinine on file in past 12 months     Recent Labs   Lab Test 07/09/18  0819   CR 0.66             Passed - Normal serum potassium on file in past 12 months     Recent Labs   Lab Test 07/09/18  0819   POTASSIUM 3.6             Passed - No positive pregnancy test within past 12 months        alum & mag hydroxide-simethicone (ANTACID) 200-200-20 MG/5ML SUSP suspension 150 mL 0     Sig: Take 15 mLs by mouth every 4 hours as needed for " indigestion       There is no refill protocol information for this order        pioglitazone (ACTOS) 45 MG tablet 30 tablet 0     Sig: Take 1 tablet (45 mg) by mouth daily       Thiazolidinedione Agents (TZDs)  Failed - 6/12/2019  2:32 PM        Failed - Blood pressure less than 140/90 in past 6 months     BP Readings from Last 3 Encounters:   04/01/19 155/88   02/18/19 128/74   12/18/18 142/84                 Failed - Patient has documented LDL within the past 12 mos.     Recent Labs   Lab Test 03/16/18  1211   *             Failed - Patient has a normal ALT within the past 12 mos.     Recent Labs   Lab Test 03/16/18  1211   ALT 16             Failed - Patient has a normal AST within the past 12 mos.      Recent Labs   Lab Test 01/09/15  1656   AST 17             Failed - Patient has had a Microalbumin in the past 12 mos.     Recent Labs   Lab Test 03/16/18  1212  07/16/12  1539   [id number]  --   --  5.0   [id number]  --   --  13.3   MICROL 12   < >  --    UMALCR 24.21   < >  --     < > = values in this interval not displayed.             Passed - Patient has documented A1c within the specified period of time.     If HgbA1C is 8 or greater, it needs to be on file within the past 3 months.  If less than 8, must be on file within the past 6 months.     Recent Labs   Lab Test 04/01/19 09/17/18  1014   A1C  --   --  8.9*   HEMOGLOBINA1 9.1*   < >  --     < > = values in this interval not displayed.             Passed - Diagnosis not CHF        Passed - Medication is active on med list        Passed - Patient is age 18 or older        Passed - Patient is not pregnant        Passed - Patient has a normal serum Creatinine in the past 12 months     Recent Labs   Lab Test 07/09/18  0819   CR 0.66             Passed - Patient has not had a positive pregnancy test within the past 12 mos.        Passed - Recent (6 mo) or future (30 days) visit within the authorizing provider's specialty     Patient had office visit in the  "last 6 months or has a visit in the next 30 days with authorizing provider or within the authorizing provider's specialty.  See \"Patient Info\" tab in inbasket, or \"Choose Columns\" in Meds & Orders section of the refill encounter.          alum & mag hydroxide-simethicone (ANTACID) 200-200-20 MG/5ML SUSP suspension  Last Written Prescription Date:  04/10/2019  Last Fill Quantity: 150ml,  # refills: 0   Last office visit: 2/18/2019 with prescribing provider:  02/18/2019   Future Office Visit:    Requested Prescriptions   Pending Prescriptions Disp Refills     vitamin D2 (ERGOCALCIFEROL) 32241 units (1250 mcg) capsule 8 capsule 6     Sig: Take 1 capsule (50,000 Units) by mouth every 7 days       There is no refill protocol information for this order        lisinopril (PRINIVIL/ZESTRIL) 2.5 MG tablet 30 tablet 0     Sig: Take 1 tablet (2.5 mg) by mouth daily       ACE Inhibitors (Including Combos) Protocol Failed - 6/12/2019  2:32 PM        Failed - Blood pressure under 140/90 in past 12 months     BP Readings from Last 3 Encounters:   04/01/19 155/88   02/18/19 128/74   12/18/18 142/84                 Passed - Recent (12 mo) or future (30 days) visit within the authorizing provider's specialty     Patient had office visit in the last 12 months or has a visit in the next 30 days with authorizing provider or within the authorizing provider's specialty.  See \"Patient Info\" tab in inbasket, or \"Choose Columns\" in Meds & Orders section of the refill encounter.              Passed - Medication is active on med list        Passed - Patient is age 18 or older        Passed - No active pregnancy on record        Passed - Normal serum creatinine on file in past 12 months     Recent Labs   Lab Test 07/09/18  0819   CR 0.66             Passed - Normal serum potassium on file in past 12 months     Recent Labs   Lab Test 07/09/18  0819   POTASSIUM 3.6             Passed - No positive pregnancy test within past 12 months        alum " & mag hydroxide-simethicone (ANTACID) 200-200-20 MG/5ML SUSP suspension 150 mL 0     Sig: Take 15 mLs by mouth every 4 hours as needed for indigestion       There is no refill protocol information for this order        pioglitazone (ACTOS) 45 MG tablet 30 tablet 0     Sig: Take 1 tablet (45 mg) by mouth daily       Thiazolidinedione Agents (TZDs)  Failed - 6/12/2019  2:32 PM        Failed - Blood pressure less than 140/90 in past 6 months     BP Readings from Last 3 Encounters:   04/01/19 155/88   02/18/19 128/74   12/18/18 142/84                 Failed - Patient has documented LDL within the past 12 mos.     Recent Labs   Lab Test 03/16/18  1211   *             Failed - Patient has a normal ALT within the past 12 mos.     Recent Labs   Lab Test 03/16/18  1211   ALT 16             Failed - Patient has a normal AST within the past 12 mos.      Recent Labs   Lab Test 01/09/15  1656   AST 17             Failed - Patient has had a Microalbumin in the past 12 mos.     Recent Labs   Lab Test 03/16/18  1212  07/16/12  1539   [id number]  --   --  5.0   [id number]  --   --  13.3   MICROL 12   < >  --    UMALCR 24.21   < >  --     < > = values in this interval not displayed.             Passed - Patient has documented A1c within the specified period of time.     If HgbA1C is 8 or greater, it needs to be on file within the past 3 months.  If less than 8, must be on file within the past 6 months.     Recent Labs   Lab Test 04/01/19 09/17/18  1014   A1C  --   --  8.9*   HEMOGLOBINA1 9.1*   < >  --     < > = values in this interval not displayed.             Passed - Diagnosis not CHF        Passed - Medication is active on med list        Passed - Patient is age 18 or older        Passed - Patient is not pregnant        Passed - Patient has a normal serum Creatinine in the past 12 months     Recent Labs   Lab Test 07/09/18  0819   CR 0.66             Passed - Patient has not had a positive pregnancy test within the past 12 mos.  "       Passed - Recent (6 mo) or future (30 days) visit within the authorizing provider's specialty     Patient had office visit in the last 6 months or has a visit in the next 30 days with authorizing provider or within the authorizing provider's specialty.  See \"Patient Info\" tab in inbasket, or \"Choose Columns\" in Meds & Orders section of the refill encounter.          pioglitazone (ACTOS) 45 MG tablet  Last Written Prescription Date:  05/14/2019  Last Fill Quantity: 30,  # refills: 0   Last office visit: 2/18/2019 with prescribing provider:  02/18/2019   Future Office Visit:    Requested Prescriptions   Pending Prescriptions Disp Refills     vitamin D2 (ERGOCALCIFEROL) 73846 units (1250 mcg) capsule 8 capsule 6     Sig: Take 1 capsule (50,000 Units) by mouth every 7 days       There is no refill protocol information for this order        lisinopril (PRINIVIL/ZESTRIL) 2.5 MG tablet 30 tablet 0     Sig: Take 1 tablet (2.5 mg) by mouth daily       ACE Inhibitors (Including Combos) Protocol Failed - 6/12/2019  2:32 PM        Failed - Blood pressure under 140/90 in past 12 months     BP Readings from Last 3 Encounters:   04/01/19 155/88   02/18/19 128/74   12/18/18 142/84                 Passed - Recent (12 mo) or future (30 days) visit within the authorizing provider's specialty     Patient had office visit in the last 12 months or has a visit in the next 30 days with authorizing provider or within the authorizing provider's specialty.  See \"Patient Info\" tab in inbasket, or \"Choose Columns\" in Meds & Orders section of the refill encounter.              Passed - Medication is active on med list        Passed - Patient is age 18 or older        Passed - No active pregnancy on record        Passed - Normal serum creatinine on file in past 12 months     Recent Labs   Lab Test 07/09/18 0819   CR 0.66             Passed - Normal serum potassium on file in past 12 months     Recent Labs   Lab Test 07/09/18 0819 "   POTASSIUM 3.6             Passed - No positive pregnancy test within past 12 months        alum & mag hydroxide-simethicone (ANTACID) 200-200-20 MG/5ML SUSP suspension 150 mL 0     Sig: Take 15 mLs by mouth every 4 hours as needed for indigestion       There is no refill protocol information for this order        pioglitazone (ACTOS) 45 MG tablet 30 tablet 0     Sig: Take 1 tablet (45 mg) by mouth daily       Thiazolidinedione Agents (TZDs)  Failed - 6/12/2019  2:32 PM        Failed - Blood pressure less than 140/90 in past 6 months     BP Readings from Last 3 Encounters:   04/01/19 155/88   02/18/19 128/74   12/18/18 142/84                 Failed - Patient has documented LDL within the past 12 mos.     Recent Labs   Lab Test 03/16/18  1211   *             Failed - Patient has a normal ALT within the past 12 mos.     Recent Labs   Lab Test 03/16/18  1211   ALT 16             Failed - Patient has a normal AST within the past 12 mos.      Recent Labs   Lab Test 01/09/15  1656   AST 17             Failed - Patient has had a Microalbumin in the past 12 mos.     Recent Labs   Lab Test 03/16/18  1212  07/16/12  1539   [id number]  --   --  5.0   [id number]  --   --  13.3   MICROL 12   < >  --    UMALCR 24.21   < >  --     < > = values in this interval not displayed.             Passed - Patient has documented A1c within the specified period of time.     If HgbA1C is 8 or greater, it needs to be on file within the past 3 months.  If less than 8, must be on file within the past 6 months.     Recent Labs   Lab Test 04/01/19 09/17/18  1014   A1C  --   --  8.9*   HEMOGLOBINA1 9.1*   < >  --     < > = values in this interval not displayed.             Passed - Diagnosis not CHF        Passed - Medication is active on med list        Passed - Patient is age 18 or older        Passed - Patient is not pregnant        Passed - Patient has a normal serum Creatinine in the past 12 months     Recent Labs   Lab Test 07/09/18  0819  "  CR 0.66             Passed - Patient has not had a positive pregnancy test within the past 12 mos.        Passed - Recent (6 mo) or future (30 days) visit within the authorizing provider's specialty     Patient had office visit in the last 6 months or has a visit in the next 30 days with authorizing provider or within the authorizing provider's specialty.  See \"Patient Info\" tab in inbasket, or \"Choose Columns\" in Meds & Orders section of the refill encounter.                    "

## 2020-12-11 LAB — SARS-COV-2 RNA SPEC QL NAA+PROBE: SIGNIFICANT CHANGE UP

## 2021-01-08 ENCOUNTER — EMERGENCY (EMERGENCY)
Facility: HOSPITAL | Age: 41
LOS: 1 days | Discharge: HOME | End: 2021-01-08
Admitting: EMERGENCY MEDICINE
Payer: COMMERCIAL

## 2021-01-08 VITALS
SYSTOLIC BLOOD PRESSURE: 115 MMHG | HEART RATE: 52 BPM | DIASTOLIC BLOOD PRESSURE: 69 MMHG | OXYGEN SATURATION: 97 % | HEIGHT: 72 IN | RESPIRATION RATE: 18 BRPM | TEMPERATURE: 98 F

## 2021-01-08 DIAGNOSIS — Z20.822 CONTACT WITH AND (SUSPECTED) EXPOSURE TO COVID-19: ICD-10-CM

## 2021-01-08 PROCEDURE — 99283 EMERGENCY DEPT VISIT LOW MDM: CPT

## 2021-01-08 NOTE — ED PROVIDER NOTE - PATIENT PORTAL LINK FT
You can access the FollowMyHealth Patient Portal offered by [facility] by registering at the following website: [email or web address]. By joining [facility]’s FollowMyHealth portal, you will also be able to view your health information using other applications (apps) compatible with our system.

## 2021-01-08 NOTE — ED PROVIDER NOTE - NSFOLLOWUPINSTRUCTIONS_ED_ALL_ED_FT
IF YOU DO NOT GET YOUR RESULTS WITHIN 48 HOURS PLEASE CALL [phone].    IF YOUR RESULT COMES BACK POSITIVE:    1. STAY HOME for 14 DAYS  2. Minimize Human contact to ONLY ESSENTIAL  3. Every time you wash your hands, sing the HAPPY BIRTHDAY Song so you know you're washing long enough.  Make sure to scrub the webspace between your fingers.  4. DRINK 1-3 Liters of fluids day x at least 5 days.  To remain hydrated. Your fatigue, lightheadedness, and body aches will decrease and your fever has a better chance of breaking if you are well hydrated.    5. For your Fever and Body aches takes Tylenol 650-100mg every 4-6h (max 4000mg/day). Try not to use ibuprofen, aspirin or naproxen (Advil, Motrin or Aleve) as these may worsen Coronavirus infection.  6. RETURN TO THE ER IMMEDIATELY IF YOU HAVE WORSENING SHORTNESS OF BREATH. SYMPTOMS USUALLY PEAK BETWEEN DAY 7-10.

## 2021-02-01 ENCOUNTER — EMERGENCY (EMERGENCY)
Facility: HOSPITAL | Age: 41
LOS: 1 days | Discharge: HOME | End: 2021-02-01
Attending: EMERGENCY MEDICINE | Admitting: EMERGENCY MEDICINE
Payer: COMMERCIAL

## 2021-02-01 VITALS
RESPIRATION RATE: 18 BRPM | HEIGHT: 72 IN | DIASTOLIC BLOOD PRESSURE: 60 MMHG | SYSTOLIC BLOOD PRESSURE: 117 MMHG | HEART RATE: 80 BPM | TEMPERATURE: 99 F | OXYGEN SATURATION: 98 %

## 2021-02-01 DIAGNOSIS — Z79.51 LONG TERM (CURRENT) USE OF INHALED STEROIDS: ICD-10-CM

## 2021-02-01 DIAGNOSIS — R43.0 ANOSMIA: ICD-10-CM

## 2021-02-01 DIAGNOSIS — Z20.822 CONTACT WITH AND (SUSPECTED) EXPOSURE TO COVID-19: ICD-10-CM

## 2021-02-01 PROCEDURE — 99284 EMERGENCY DEPT VISIT MOD MDM: CPT

## 2021-02-01 NOTE — ED PROVIDER NOTE - NS ED ROS FT
GENERAL: No fever and no chills  EENT: No sore throat and no dysphagia.  RESPIRATORY: No cough and no SOB.  GI: No abdo pain, N/V/D GENERAL: No fever and no chills  EENT: No sore throat and no dysphagia. +Loss of smell  RESPIRATORY: No cough and no SOB.  GI: No abdo pain, N/V/D

## 2021-02-01 NOTE — ED PROVIDER NOTE - CLINICAL SUMMARY MEDICAL DECISION MAKING FREE TEXT BOX
Patient requesting testing for COVID-19. On exam, Patient appears well and in no apparent distress. Nasopharyngeal PCR has been obtained, and Patient has been guided on how to obtain their results.  General COVID-19 discharge instructions have been given to the Patient. Patient agrees to receiving results electronically. Patient requesting testing for COVID-19 as well as antibody testing. On exam, Patient appears well and in no apparent distress. Nasopharyngeal PCR has been obtained as well as labs, and Patient has been guided on how to obtain their results.  General COVID-19 discharge instructions have been given to the Patient. Patient agrees to receiving results electronically.

## 2021-02-01 NOTE — ED PROVIDER NOTE - NSFOLLOWUPINSTRUCTIONS_ED_ALL_ED_FT
THE COVID 19 TEST RESULTS  - results may take up to 2-3 days to become available   - if you have consented, you will receive your results electronically   -  you can check [facility] [facility] or call [phone] to discuss your results with our nursing staff    Please continue to self quarantine (home isolation) until your result is back and follow instructions accordingly  - positive: complete home isolation for a total of 14 days since day of testing and no more fever with feeling back to baseline   - negative: you will be able to stop home isolation but still practice standard precautions, similar to how you would manage a regular flu/cold.    Return to ER for any worsening symptoms, such as persistent fever >100.4F, shortness of breath, coughing up bloody sputum, chest pain, lethargy, and fainting    Please remember to wash your hands frequently (>20 seconds each time), avoid touching your face, and cover your cough/sneeze.  Always wear a mask when you are outside of your home and practice social distancing.    Only take Tylenol for fever/pain control and avoid NSAIDs (ibuprofen/Advil/Aleve/naproxen) due to potential increased risk of exacerbating COVID-19 infection

## 2021-02-01 NOTE — ED PROVIDER NOTE - OBJECTIVE STATEMENT
39 y/o male presents to the ED requesting COVID-19 testing. Patient is currently asymptomatic and has no other medical complaints at this time. Denies fever, chills, chest pain, SOB, cough. 41 y/o male presents to the ED requesting COVID-19 testing. Patient woke up this morning with slight lost of smell. No respiratory distress. Denies fever, chills, chest pain, SOB, cough. Patient has had COVID in the past. Also requesting antibody testing.

## 2021-02-01 NOTE — ED PROVIDER NOTE - PHYSICAL EXAMINATION
Gen - WDWN, NAD, comfortable and non-toxic appearing  Skin - warm, dry, intact   Resp - Breathing comfortably on RA.  Neuro - AxOx3, clear speech, grossly unremarkable.  Psych - Calm and cooperative. Normal mood and affect.
Mild intoxication

## 2021-02-02 LAB
SARS-COV-2 IGG SERPL QL IA: POSITIVE
SARS-COV-2 IGM SERPL IA-ACNC: 7.5 INDEX — HIGH
SARS-COV-2 RNA SPEC QL NAA+PROBE: SIGNIFICANT CHANGE UP

## 2021-02-18 ENCOUNTER — EMERGENCY (EMERGENCY)
Facility: HOSPITAL | Age: 41
LOS: 1 days | Discharge: HOME | End: 2021-02-18
Attending: EMERGENCY MEDICINE | Admitting: EMERGENCY MEDICINE
Payer: COMMERCIAL

## 2021-02-18 VITALS
TEMPERATURE: 98 F | OXYGEN SATURATION: 95 % | HEART RATE: 52 BPM | SYSTOLIC BLOOD PRESSURE: 112 MMHG | DIASTOLIC BLOOD PRESSURE: 80 MMHG | HEIGHT: 72 IN | RESPIRATION RATE: 16 BRPM

## 2021-02-18 PROCEDURE — 99282 EMERGENCY DEPT VISIT SF MDM: CPT

## 2021-02-18 NOTE — ED PROVIDER NOTE - NSFOLLOWUPINSTRUCTIONS_ED_ALL_ED_FT
Your test results may take 1-3 days. You will get a text/email.  Please check the patient online portal (Sarah and website) for results. You can create a portal account at [email or web address] [place]. If you have old records with [email or web address] or [facility]  or encounter any difficulties with us you will need to call the HELP line to merge results [id number] (Mon-Fri 8a-5p).    Please follow the instructions on provided coronavirus discharge educational forms and if needed self quarantine for 14 days.     If you test positive for COVID 19:    1. STAY HOME for 14 DAYS  2. Minimize Human contact to ONLY ESSENTIAL  3. Every time you wash your hands, sing the HAPPY BIRTHDAY Song so you know you're washing long enough.  Make sure to scrub the webspace between your fingers.  4. DRINK 1-3 Liters of fluids day x at least 5 days.  To remain hydrated. Your fatigue, lightheadedness, and body aches will decrease and your fever has a better chance of breaking if you are well hydrated.    5. For your Fever and Body aches takes Tylenol 650-100mg every 4-6h (max 4000mg/day). Try not to use ibuprofen, aspirin or naproxen (Advil, Motrin or Aleve) as these may worsen Coronavirus infection.  6. Use an inhaler for mild shortness of breath and cough  7. RETURN TO THE ER IMMEDIATELY IF YOU HAVE WORSENING SHORTNESS OF BREATH  8. TAKE THE FOLLOWING SUPPLEMENTS DAILY.        VITAMIN C 1000MG ONCE DAILY.        VITAMIN D 200IU ONCE DAILY.        ZINC 50MG ONCE DAILY.

## 2021-02-18 NOTE — ED PROVIDER NOTE - PATIENT PORTAL LINK FT
You can access the FollowMyHealth Patient Portal offered by [facility] by registering at the following website: [email or web address]. By joining [facility]’s FollowMyHealth portal, you will also be able to view your health information using other applications (apps) compatible with our system.

## 2021-02-19 LAB — SARS-COV-2 RNA SPEC QL NAA+PROBE: SIGNIFICANT CHANGE UP

## 2021-02-22 DIAGNOSIS — Z20.822 CONTACT WITH AND (SUSPECTED) EXPOSURE TO COVID-19: ICD-10-CM

## 2023-01-22 ENCOUNTER — EMERGENCY (EMERGENCY)
Facility: HOSPITAL | Age: 43
LOS: 1 days | Discharge: HOME | End: 2023-01-22
Admitting: EMERGENCY MEDICINE
Payer: COMMERCIAL

## 2023-01-22 VITALS — DIASTOLIC BLOOD PRESSURE: 62 MMHG | SYSTOLIC BLOOD PRESSURE: 103 MMHG

## 2023-01-22 VITALS
SYSTOLIC BLOOD PRESSURE: 110 MMHG | OXYGEN SATURATION: 97 % | TEMPERATURE: 98 F | WEIGHT: 179.9 LBS | HEIGHT: 72 IN | RESPIRATION RATE: 16 BRPM | HEART RATE: 58 BPM | DIASTOLIC BLOOD PRESSURE: 69 MMHG

## 2023-01-22 LAB
ALBUMIN SERPL ELPH-MCNC: 4.2 G/DL — SIGNIFICANT CHANGE UP (ref 3.4–5)
ALP SERPL-CCNC: 63 U/L — SIGNIFICANT CHANGE UP (ref 40–120)
ALT FLD-CCNC: 63 U/L — HIGH (ref 12–42)
ANION GAP SERPL CALC-SCNC: 6 MMOL/L — LOW (ref 9–16)
AST SERPL-CCNC: 34 U/L — SIGNIFICANT CHANGE UP (ref 15–37)
BASOPHILS # BLD AUTO: 0.03 K/UL — SIGNIFICANT CHANGE UP (ref 0–0.2)
BASOPHILS NFR BLD AUTO: 0.6 % — SIGNIFICANT CHANGE UP (ref 0–2)
BILIRUB SERPL-MCNC: 0.9 MG/DL — SIGNIFICANT CHANGE UP (ref 0.2–1.2)
BUN SERPL-MCNC: 20 MG/DL — SIGNIFICANT CHANGE UP (ref 7–23)
CALCIUM SERPL-MCNC: 9.4 MG/DL — SIGNIFICANT CHANGE UP (ref 8.5–10.5)
CHLORIDE SERPL-SCNC: 108 MMOL/L — SIGNIFICANT CHANGE UP (ref 96–108)
CO2 SERPL-SCNC: 29 MMOL/L — SIGNIFICANT CHANGE UP (ref 22–31)
CREAT SERPL-MCNC: 1.09 MG/DL — SIGNIFICANT CHANGE UP (ref 0.5–1.3)
EGFR: 87 ML/MIN/1.73M2 — SIGNIFICANT CHANGE UP
EOSINOPHIL # BLD AUTO: 0.08 K/UL — SIGNIFICANT CHANGE UP (ref 0–0.5)
EOSINOPHIL NFR BLD AUTO: 1.5 % — SIGNIFICANT CHANGE UP (ref 0–6)
GLUCOSE SERPL-MCNC: 120 MG/DL — HIGH (ref 70–99)
HCT VFR BLD CALC: 44.9 % — SIGNIFICANT CHANGE UP (ref 39–50)
HGB BLD-MCNC: 15.4 G/DL — SIGNIFICANT CHANGE UP (ref 13–17)
IMM GRANULOCYTES NFR BLD AUTO: 0.2 % — SIGNIFICANT CHANGE UP (ref 0–0.9)
LACTATE SERPL-SCNC: 0.4 MMOL/L — SIGNIFICANT CHANGE UP (ref 0.4–2)
LYMPHOCYTES # BLD AUTO: 2.32 K/UL — SIGNIFICANT CHANGE UP (ref 1–3.3)
LYMPHOCYTES # BLD AUTO: 44.2 % — HIGH (ref 13–44)
MCHC RBC-ENTMCNC: 31.9 PG — SIGNIFICANT CHANGE UP (ref 27–34)
MCHC RBC-ENTMCNC: 34.3 GM/DL — SIGNIFICANT CHANGE UP (ref 32–36)
MCV RBC AUTO: 93 FL — SIGNIFICANT CHANGE UP (ref 80–100)
MONOCYTES # BLD AUTO: 0.34 K/UL — SIGNIFICANT CHANGE UP (ref 0–0.9)
MONOCYTES NFR BLD AUTO: 6.5 % — SIGNIFICANT CHANGE UP (ref 2–14)
NEUTROPHILS # BLD AUTO: 2.47 K/UL — SIGNIFICANT CHANGE UP (ref 1.8–7.4)
NEUTROPHILS NFR BLD AUTO: 47 % — SIGNIFICANT CHANGE UP (ref 43–77)
NRBC # BLD: 0 /100 WBCS — SIGNIFICANT CHANGE UP (ref 0–0)
PLATELET # BLD AUTO: 212 K/UL — SIGNIFICANT CHANGE UP (ref 150–400)
POTASSIUM SERPL-MCNC: 4.5 MMOL/L — SIGNIFICANT CHANGE UP (ref 3.5–5.3)
POTASSIUM SERPL-SCNC: 4.5 MMOL/L — SIGNIFICANT CHANGE UP (ref 3.5–5.3)
PROT SERPL-MCNC: 7 G/DL — SIGNIFICANT CHANGE UP (ref 6.4–8.2)
RBC # BLD: 4.83 M/UL — SIGNIFICANT CHANGE UP (ref 4.2–5.8)
RBC # FLD: 13 % — SIGNIFICANT CHANGE UP (ref 10.3–14.5)
SODIUM SERPL-SCNC: 143 MMOL/L — SIGNIFICANT CHANGE UP (ref 132–145)
WBC # BLD: 5.25 K/UL — SIGNIFICANT CHANGE UP (ref 3.8–10.5)
WBC # FLD AUTO: 5.25 K/UL — SIGNIFICANT CHANGE UP (ref 3.8–10.5)

## 2023-01-22 PROCEDURE — 70450 CT HEAD/BRAIN W/O DYE: CPT | Mod: 26

## 2023-01-22 PROCEDURE — 99284 EMERGENCY DEPT VISIT MOD MDM: CPT

## 2023-01-22 NOTE — ED PROVIDER NOTE - PHYSICAL EXAMINATION
VITAL SIGNS: I have reviewed nursing notes and confirm.  CONSTITUTIONAL: Well-developed; well-nourished; in no acute distress.  SKIN: Skin is warm and dry, no acute rash.  HEAD: Normocephalic; atraumatic.  EYES: PERRL, EOMI.  NECK: Supple; non tender.  CARD: S1, S2 normal; no murmurs, gallops, or rubs. Regular rate and rhythm.  RESP: No wheezes, rales or rhonchi.  ABD: Soft; non-distended; non-tender; no rebound or guarding.  EXT: Normal ROM. No clubbing, cyanosis or edema.  NEURO: Alert, oriented. Grossly unremarkable. VIEIRA, normal tone, no gross motor or sensory changes. Fluent speech.   PSYCH: Cooperative, appropriate. Mood and affect wnl.

## 2023-01-22 NOTE — ED PROVIDER NOTE - PATIENT PORTAL LINK FT
You can access the FollowMyHealth Patient Portal offered by [facility] by registering at the following website: [email or web address]. By joining [facility]’s FollowMyHealth portal, you will also be able to view your health information using other applications (apps) compatible with our system.

## 2023-01-22 NOTE — ED ADULT NURSE NOTE - OBJECTIVE STATEMENT
pt is 42y male, here for headache and lightheadedness x1 week, denies any fevers, CP, or focal weakness, reports hx of long covid in 2020 and 2021, denies any other medical hx, pt is a&ox3, ambulatory with steady gait, no neuro deficits noted, NAD present

## 2023-01-22 NOTE — ED ADULT TRIAGE NOTE - CHIEF COMPLAINT QUOTE
Pt walk in complaining of generalized fatigue, frontal headaches, and lightheadedness for the last week. Denies any cough fevers chills. Denies any recent falls or headstrikes.

## 2023-01-22 NOTE — ED PROVIDER NOTE - NS ED ROS FT
+headache +lightheadedness +fatigue  Denies fevers, chills, nausea, vomiting, diarrhea, constipation, abdominal pain, urinary symptoms, chest pain, palpitations, shortness of breath, dyspnea on exertion, syncope/near syncope, cough/URI symptoms, weakness, numbness, focal deficits, visual changes, gait or balance changes, dizziness

## 2023-01-22 NOTE — ED PROVIDER NOTE - PROGRESS NOTE DETAILS
CT head neg, Labs reviewed and no acute findings. Will DC patient to f/u w/ PMD. Pt stable as he leaves.

## 2023-01-22 NOTE — ED PROVIDER NOTE - OBJECTIVE STATEMENT
42-year-old male, denies past medical history, presents to the emergency department complaining of fatigue, sensation of headache, and feeling lightheaded for the past 4 days.  Patient states he has suffered from long COVID symptoms, experiencing episodes of confusion and brain fogginess.  He states the headache is typical with his long COVID, however he will usually only have symptoms for about 30 minutes before it self resolves.  He reports his headache has not resolved which prompted him to come to the emergency department.  Patient denies changes in vision, nausea, vomiting, dizziness, back pain, neck pain, fevers or chills.

## 2023-01-22 NOTE — ED PROVIDER NOTE - CLINICAL SUMMARY MEDICAL DECISION MAKING FREE TEXT BOX
42-year-old male, denies past medical history, presents to the emergency department complaining of sensation of headache and feeling lightheaded for the past 4 days. Patient not found to have focal deficits on exam and is otherwise well-appearing.  We will plan to obtain CT head to evaluate further for any acute abnormality.  Will send EKG as well as basic medical labs.  Headache medication offered for pain relief and refused by patient.  Will reassess, dispo pending medical work-up.

## 2023-01-24 DIAGNOSIS — R00.1 BRADYCARDIA, UNSPECIFIED: ICD-10-CM

## 2023-01-24 DIAGNOSIS — R51.9 HEADACHE, UNSPECIFIED: ICD-10-CM

## 2023-01-24 DIAGNOSIS — R53.83 OTHER FATIGUE: ICD-10-CM

## 2023-01-24 DIAGNOSIS — R42 DIZZINESS AND GIDDINESS: ICD-10-CM

## 2023-01-24 DIAGNOSIS — R29.818 OTHER SYMPTOMS AND SIGNS INVOLVING THE NERVOUS SYSTEM: ICD-10-CM

## 2023-01-24 DIAGNOSIS — Z86.16 PERSONAL HISTORY OF COVID-19: ICD-10-CM

## 2023-11-20 NOTE — ED PROVIDER NOTE - IV ALTEPLASE EXCL ABS HIDDEN
show Detail Level: Zone Render In Strict Bullet Format?: No Plan: Pt is tolerating Doxycycline 50mg daily for Rosacea. Continue Regimen: Doxycycline 50mg daily with food and glass of water Plan: Continue the following treatments: Finasteride 5mg tablet\\nTake 1/2 tabs by mouth once daily.

## 2025-05-19 NOTE — ED ADULT TRIAGE NOTE - TEMPERATURE IN CELSIUS (DEGREES C)
[FreeTextEntry1] : This is a 54 y/o Male presents today for initial evaluation of ventral hernia.  The pt with a PMHx of hepatic encephalopathy, PVT and PSHx of........ Patient reports worsening of discomfort over the last.......weeks/month in abdomen exacerbated by coughing, straining, sneezing, lifting. Patient reports expansion of bulging/swelling that is reducible and associated with intermittent non-radiating pain. Relief with self-reduction/low activity/reclining. Denies nausea, vomiting, fever, chills, pain out of proportion, chest pain, HA, dizziness. Denies constipation, difficulty with bowel movements or urination. Denies shortness of breath, ROWE, or difficulty breathing, patient can walk / climb 2 flights without stopping. Denies hx of blood clots or bleeding problems.  Imaging: CT A/P w/ contrast (11/12/24): Increased size of anterior abdominal wall hernias containing fluid/ascites. Grossly stable large volume ascites  Exam significant for:  + ??incisional/ parastomal/ spigelian hernia, reducible.   The nature and risks of hernia were discussed as were signs and symptoms of incarceration, obstruction and strangulation as possible risks of observation. A thorough preoperative education has been performed about the role and the different surgical options have been discussed with patient. Risks, benefits and alternatives have been discussed and patient verbalizes understanding. Laparoscopic/Robotic/Open repair of inguinal hernia/abdominal hernia was discussed with the patient at length. The risks, benefits, and alternatives of the types of repair as well as to the use of mesh were discussed and all questions answered. Risks of hernia repair include, but are not limited to infection, bleeding, recurrence and injury to adjacent structures and nerves. Advised patient about possible risk of future complications if hernia worsens or goes untreated. Patient would like to proceed with RA ventral repair with mesh. 36.4
